# Patient Record
Sex: FEMALE | Race: WHITE
[De-identification: names, ages, dates, MRNs, and addresses within clinical notes are randomized per-mention and may not be internally consistent; named-entity substitution may affect disease eponyms.]

---

## 2017-01-30 ENCOUNTER — RX RENEWAL (OUTPATIENT)
Age: 57
End: 2017-01-30

## 2017-02-21 ENCOUNTER — RX RENEWAL (OUTPATIENT)
Age: 57
End: 2017-02-21

## 2017-04-20 ENCOUNTER — RX RENEWAL (OUTPATIENT)
Age: 57
End: 2017-04-20

## 2017-04-24 ENCOUNTER — RX RENEWAL (OUTPATIENT)
Age: 57
End: 2017-04-24

## 2017-05-16 ENCOUNTER — APPOINTMENT (OUTPATIENT)
Dept: PULMONOLOGY | Facility: CLINIC | Age: 57
End: 2017-05-16

## 2017-05-16 VITALS
SYSTOLIC BLOOD PRESSURE: 116 MMHG | BODY MASS INDEX: 27.44 KG/M2 | HEART RATE: 71 BPM | DIASTOLIC BLOOD PRESSURE: 70 MMHG | WEIGHT: 150 LBS | OXYGEN SATURATION: 99 %

## 2017-05-16 VITALS — RESPIRATION RATE: 16 BRPM

## 2018-05-18 ENCOUNTER — RX RENEWAL (OUTPATIENT)
Age: 58
End: 2018-05-18

## 2018-05-22 ENCOUNTER — RX RENEWAL (OUTPATIENT)
Age: 58
End: 2018-05-22

## 2018-06-19 ENCOUNTER — APPOINTMENT (OUTPATIENT)
Dept: PULMONOLOGY | Facility: CLINIC | Age: 58
End: 2018-06-19
Payer: COMMERCIAL

## 2018-06-19 VITALS — HEART RATE: 76 BPM | SYSTOLIC BLOOD PRESSURE: 108 MMHG | DIASTOLIC BLOOD PRESSURE: 60 MMHG | OXYGEN SATURATION: 98 %

## 2018-06-19 VITALS — WEIGHT: 154 LBS | BODY MASS INDEX: 28.17 KG/M2

## 2018-06-19 VITALS — RESPIRATION RATE: 16 BRPM

## 2018-06-19 PROCEDURE — 99214 OFFICE O/P EST MOD 30 MIN: CPT | Mod: 25

## 2018-06-19 PROCEDURE — 94010 BREATHING CAPACITY TEST: CPT

## 2018-07-25 ENCOUNTER — RX RENEWAL (OUTPATIENT)
Age: 58
End: 2018-07-25

## 2018-09-17 ENCOUNTER — RX RENEWAL (OUTPATIENT)
Age: 58
End: 2018-09-17

## 2018-11-19 ENCOUNTER — CHART COPY (OUTPATIENT)
Age: 58
End: 2018-11-19

## 2019-02-11 ENCOUNTER — RX RENEWAL (OUTPATIENT)
Age: 59
End: 2019-02-11

## 2019-06-03 ENCOUNTER — RX RENEWAL (OUTPATIENT)
Age: 59
End: 2019-06-03

## 2019-07-08 ENCOUNTER — RX RENEWAL (OUTPATIENT)
Age: 59
End: 2019-07-08

## 2019-08-07 ENCOUNTER — RX RENEWAL (OUTPATIENT)
Age: 59
End: 2019-08-07

## 2019-10-02 ENCOUNTER — MEDICATION RENEWAL (OUTPATIENT)
Age: 59
End: 2019-10-02

## 2019-10-02 ENCOUNTER — OTHER (OUTPATIENT)
Age: 59
End: 2019-10-02

## 2020-01-29 ENCOUNTER — RX RENEWAL (OUTPATIENT)
Age: 60
End: 2020-01-29

## 2020-01-30 ENCOUNTER — LABORATORY RESULT (OUTPATIENT)
Age: 60
End: 2020-01-30

## 2020-01-30 ENCOUNTER — APPOINTMENT (OUTPATIENT)
Dept: PULMONOLOGY | Facility: CLINIC | Age: 60
End: 2020-01-30
Payer: COMMERCIAL

## 2020-01-30 VITALS
HEART RATE: 71 BPM | WEIGHT: 150 LBS | BODY MASS INDEX: 27.6 KG/M2 | SYSTOLIC BLOOD PRESSURE: 120 MMHG | OXYGEN SATURATION: 98 % | DIASTOLIC BLOOD PRESSURE: 66 MMHG | HEIGHT: 62 IN

## 2020-01-30 VITALS — RESPIRATION RATE: 16 BRPM

## 2020-01-30 LAB
BASOPHILS # BLD AUTO: 0.08 K/UL
BASOPHILS NFR BLD AUTO: 1 %
EOSINOPHIL # BLD AUTO: 0.2 K/UL
EOSINOPHIL NFR BLD AUTO: 2.6 %
HCT VFR BLD CALC: 44.9 %
HGB BLD-MCNC: 14.2 G/DL
IMM GRANULOCYTES NFR BLD AUTO: 0.3 %
LYMPHOCYTES # BLD AUTO: 1.79 K/UL
LYMPHOCYTES NFR BLD AUTO: 23.4 %
MAN DIFF?: NORMAL
MCHC RBC-ENTMCNC: 29.3 PG
MCHC RBC-ENTMCNC: 31.6 GM/DL
MCV RBC AUTO: 92.6 FL
MONOCYTES # BLD AUTO: 0.57 K/UL
MONOCYTES NFR BLD AUTO: 7.5 %
NEUTROPHILS # BLD AUTO: 4.98 K/UL
NEUTROPHILS NFR BLD AUTO: 65.2 %
PLATELET # BLD AUTO: 160 K/UL
RBC # BLD: 4.85 M/UL
RBC # FLD: 11.9 %
WBC # FLD AUTO: 7.64 K/UL

## 2020-01-30 PROCEDURE — 94010 BREATHING CAPACITY TEST: CPT

## 2020-01-30 PROCEDURE — 99214 OFFICE O/P EST MOD 30 MIN: CPT | Mod: 25

## 2020-01-30 NOTE — REVIEW OF SYSTEMS
[Fatigue] : fatigue [As Noted in HPI] : as noted in HPI [Back Pain] : ~T back pain [Snoring] : snoring [Negative] : Endocrine [de-identified] : history of skin cancer

## 2020-01-30 NOTE — ASSESSMENT
[FreeTextEntry1] : Patient's asthma does seem to be out of control. She has frequent use of rescue inhaler in addition to high-dose Breo. I'm getting an asthma profile to look for allergies and the possible use of biological agents. A course of prednisone was given as well. Followup one month with spirometry.

## 2020-01-30 NOTE — HISTORY OF PRESENT ILLNESS
[TextBox_4] : Patient remains compliant with Breo but continues to report using Ventolin twice daily. She has not been able to cut it down. Recently she feels tighter than she has.

## 2020-01-30 NOTE — PHYSICAL EXAM
[Normal Appearance] : normal appearance [General Appearance - Well Developed] : well developed [No Deformities] : no deformities [Well Groomed] : well groomed [General Appearance - Well Nourished] : well nourished [Eyelids - No Xanthelasma] : the eyelids demonstrated no xanthelasmas [General Appearance - In No Acute Distress] : no acute distress [Normal Conjunctiva] : the conjunctiva exhibited no abnormalities [Neck Appearance] : the appearance of the neck was normal [Jugular Venous Distention Increased] : there was no jugular-venous distention [Neck Cervical Mass (___cm)] : no neck mass was observed [Thyroid Diffuse Enlargement] : the thyroid was not enlarged [Heart Rate And Rhythm] : heart rate and rhythm were normal [Thyroid Nodule] : there were no palpable thyroid nodules [Heart Sounds] : normal S1 and S2 [Murmurs] : no murmurs present [Auscultation Breath Sounds / Voice Sounds] : lungs were clear to auscultation bilaterally [Respiration, Rhythm And Depth] : normal respiratory rhythm and effort [Exaggerated Use Of Accessory Muscles For Inspiration] : no accessory muscle use [Abdomen Tenderness] : non-tender [Abdomen Soft] : soft [Abdomen Mass (___ Cm)] : no abdominal mass palpated [Gait - Sufficient For Exercise Testing] : the gait was sufficient for exercise testing [Abnormal Walk] : normal gait [Deep Tendon Reflexes (DTR)] : deep tendon reflexes were 2+ and symmetric [Sensation] : the sensory exam was normal to light touch and pinprick [No Focal Deficits] : no focal deficits [Affect] : the affect was normal [Impaired Insight] : insight and judgment were intact [Oriented To Time, Place, And Person] : oriented to person, place, and time [Normal Oral Mucosa] : normal oral mucosa [No Oral Cyanosis] : no oral cyanosis [No Oral Pallor] : no oral pallor [Nail Clubbing] : no clubbing of the fingernails [Cyanosis, Localized] : no localized cyanosis [Petechial Hemorrhages (___cm)] : no petechial hemorrhages [Skin Color & Pigmentation] : normal skin color and pigmentation [] : no rash [Skin Turgor] : normal skin turgor [FreeTextEntry1] : mild bilateral calf tenderness no edema

## 2020-02-03 LAB
A ALTERNATA IGE QN: 1.68 KUA/L
A FUMIGATUS IGE QN: <0.1 KUA/L
C ALBICANS IGE QN: 0.13 KUA/L
C HERBARUM IGE QN: <0.1 KUA/L
CAT DANDER IGE QN: 4.3 KUA/L
COMMON RAGWEED IGE QN: 0.41 KUA/L
D FARINAE IGE QN: 0.41 KUA/L
D PTERONYSS IGE QN: 0.88 KUA/L
DEPRECATED A ALTERNATA IGE RAST QL: 2
DEPRECATED A FUMIGATUS IGE RAST QL: 0
DEPRECATED C ALBICANS IGE RAST QL: NORMAL
DEPRECATED C HERBARUM IGE RAST QL: 0
DEPRECATED CAT DANDER IGE RAST QL: 3
DEPRECATED COMMON RAGWEED IGE RAST QL: 1
DEPRECATED D FARINAE IGE RAST QL: 1
DEPRECATED D PTERONYSS IGE RAST QL: 2
DEPRECATED DOG DANDER IGE RAST QL: 3
DEPRECATED M RACEMOSUS IGE RAST QL: 0
DEPRECATED ROACH IGE RAST QL: 0
DEPRECATED TIMOTHY IGE RAST QL: 0
DEPRECATED WHITE OAK IGE RAST QL: 0
DOG DANDER IGE QN: 15.2 KUA/L
M RACEMOSUS IGE QN: <0.1 KUA/L
ROACH IGE QN: <0.1 KUA/L
TIMOTHY IGE QN: <0.1 KUA/L
WHITE OAK IGE QN: <0.1 KUA/L

## 2020-03-06 ENCOUNTER — APPOINTMENT (OUTPATIENT)
Dept: PULMONOLOGY | Facility: CLINIC | Age: 60
End: 2020-03-06
Payer: COMMERCIAL

## 2020-03-06 VITALS
HEIGHT: 62 IN | WEIGHT: 152 LBS | DIASTOLIC BLOOD PRESSURE: 60 MMHG | HEART RATE: 77 BPM | OXYGEN SATURATION: 98 % | BODY MASS INDEX: 27.97 KG/M2 | SYSTOLIC BLOOD PRESSURE: 120 MMHG

## 2020-03-06 VITALS — RESPIRATION RATE: 16 BRPM

## 2020-03-06 DIAGNOSIS — J45.909 UNSPECIFIED ASTHMA, UNCOMPLICATED: ICD-10-CM

## 2020-03-06 PROCEDURE — 99214 OFFICE O/P EST MOD 30 MIN: CPT | Mod: 25

## 2020-03-06 PROCEDURE — 94010 BREATHING CAPACITY TEST: CPT

## 2020-03-06 RX ORDER — PREDNISONE 10 MG/1
10 TABLET ORAL
Qty: 30 | Refills: 1 | Status: DISCONTINUED | COMMUNITY
Start: 2020-01-30 | End: 2020-03-06

## 2020-03-06 NOTE — REVIEW OF SYSTEMS
[Fatigue] : fatigue [As Noted in HPI] : as noted in HPI [Back Pain] : ~T back pain [Snoring] : snoring [Negative] : Pulmonary Hypertension [de-identified] : history of skin cancer

## 2020-03-06 NOTE — HISTORY OF PRESENT ILLNESS
[TextBox_4] : Generally feeling well without any exacerbations since last visit. She's been using Singulair only occasionally.

## 2020-03-06 NOTE — ASSESSMENT
[FreeTextEntry1] : Patient has asthma with mildly elevated IgE level and multiple aeroallergens. Currently she's under control. I told her I would like her on Singulair daily in addition to her other medications. We will follow her in 4 months. If she has further exacerbations, we will have to consider Xolair injections.

## 2020-03-06 NOTE — PHYSICAL EXAM
[General Appearance - Well Developed] : well developed [Normal Appearance] : normal appearance [Well Groomed] : well groomed [General Appearance - Well Nourished] : well nourished [No Deformities] : no deformities [General Appearance - In No Acute Distress] : no acute distress [Normal Conjunctiva] : the conjunctiva exhibited no abnormalities [Eyelids - No Xanthelasma] : the eyelids demonstrated no xanthelasmas [Neck Appearance] : the appearance of the neck was normal [Neck Cervical Mass (___cm)] : no neck mass was observed [Jugular Venous Distention Increased] : there was no jugular-venous distention [Thyroid Diffuse Enlargement] : the thyroid was not enlarged [Thyroid Nodule] : there were no palpable thyroid nodules [Heart Rate And Rhythm] : heart rate and rhythm were normal [Heart Sounds] : normal S1 and S2 [Murmurs] : no murmurs present [Respiration, Rhythm And Depth] : normal respiratory rhythm and effort [Exaggerated Use Of Accessory Muscles For Inspiration] : no accessory muscle use [Auscultation Breath Sounds / Voice Sounds] : lungs were clear to auscultation bilaterally [Abdomen Soft] : soft [Abdomen Tenderness] : non-tender [Abdomen Mass (___ Cm)] : no abdominal mass palpated [Abnormal Walk] : normal gait [Gait - Sufficient For Exercise Testing] : the gait was sufficient for exercise testing [Deep Tendon Reflexes (DTR)] : deep tendon reflexes were 2+ and symmetric [Sensation] : the sensory exam was normal to light touch and pinprick [No Focal Deficits] : no focal deficits [Oriented To Time, Place, And Person] : oriented to person, place, and time [Impaired Insight] : insight and judgment were intact [Affect] : the affect was normal [Normal Oral Mucosa] : normal oral mucosa [No Oral Pallor] : no oral pallor [No Oral Cyanosis] : no oral cyanosis [Nail Clubbing] : no clubbing of the fingernails [Cyanosis, Localized] : no localized cyanosis [Petechial Hemorrhages (___cm)] : no petechial hemorrhages [FreeTextEntry1] : mild bilateral calf tenderness no edema [Skin Color & Pigmentation] : normal skin color and pigmentation [Skin Turgor] : normal skin turgor [] : no rash

## 2020-09-16 ENCOUNTER — APPOINTMENT (OUTPATIENT)
Dept: PULMONOLOGY | Facility: CLINIC | Age: 60
End: 2020-09-16
Payer: COMMERCIAL

## 2020-09-16 VITALS — SYSTOLIC BLOOD PRESSURE: 118 MMHG | HEART RATE: 62 BPM | DIASTOLIC BLOOD PRESSURE: 68 MMHG | OXYGEN SATURATION: 98 %

## 2020-09-16 VITALS — RESPIRATION RATE: 16 BRPM

## 2020-09-16 DIAGNOSIS — J45.41 MODERATE PERSISTENT ASTHMA WITH (ACUTE) EXACERBATION: ICD-10-CM

## 2020-09-16 PROCEDURE — 99214 OFFICE O/P EST MOD 30 MIN: CPT

## 2020-09-16 NOTE — ASSESSMENT
[FreeTextEntry1] : Asthma with allergic component under control. I impressed upon her the importance of using montelukast every day especially during allergy season. Medications renewed. Followup 6 months.

## 2020-09-16 NOTE — PHYSICAL EXAM
[Well Groomed] : well groomed [General Appearance - Well Developed] : well developed [Normal Appearance] : normal appearance [No Deformities] : no deformities [General Appearance - Well Nourished] : well nourished [Normal Conjunctiva] : the conjunctiva exhibited no abnormalities [General Appearance - In No Acute Distress] : no acute distress [Neck Appearance] : the appearance of the neck was normal [Eyelids - No Xanthelasma] : the eyelids demonstrated no xanthelasmas [Jugular Venous Distention Increased] : there was no jugular-venous distention [Thyroid Diffuse Enlargement] : the thyroid was not enlarged [Neck Cervical Mass (___cm)] : no neck mass was observed [Heart Rate And Rhythm] : heart rate and rhythm were normal [Thyroid Nodule] : there were no palpable thyroid nodules [Murmurs] : no murmurs present [Heart Sounds] : normal S1 and S2 [Respiration, Rhythm And Depth] : normal respiratory rhythm and effort [Exaggerated Use Of Accessory Muscles For Inspiration] : no accessory muscle use [Abdomen Soft] : soft [Auscultation Breath Sounds / Voice Sounds] : lungs were clear to auscultation bilaterally [Abdomen Tenderness] : non-tender [Abdomen Mass (___ Cm)] : no abdominal mass palpated [Abnormal Walk] : normal gait [Gait - Sufficient For Exercise Testing] : the gait was sufficient for exercise testing [Deep Tendon Reflexes (DTR)] : deep tendon reflexes were 2+ and symmetric [Sensation] : the sensory exam was normal to light touch and pinprick [Oriented To Time, Place, And Person] : oriented to person, place, and time [No Focal Deficits] : no focal deficits [Affect] : the affect was normal [Impaired Insight] : insight and judgment were intact [Normal Oral Mucosa] : normal oral mucosa [No Oral Pallor] : no oral pallor [No Oral Cyanosis] : no oral cyanosis [Nail Clubbing] : no clubbing of the fingernails [Petechial Hemorrhages (___cm)] : no petechial hemorrhages [Cyanosis, Localized] : no localized cyanosis [FreeTextEntry1] : mild bilateral calf tenderness no edema [Skin Color & Pigmentation] : normal skin color and pigmentation [] : no rash [Skin Turgor] : normal skin turgor

## 2020-09-16 NOTE — HISTORY OF PRESENT ILLNESS
[TextBox_4] : Patient is generally doing well on Breo. She's been intermittent on the montelukast. Denies any exacerbations in the last 6 months. Has been gardening and doing outdoor work without shortness of breath.

## 2020-09-16 NOTE — REVIEW OF SYSTEMS
[Fatigue] : fatigue [As Noted in HPI] : as noted in HPI [Snoring] : snoring [Back Pain] : ~T back pain [de-identified] : history of skin cancer [Negative] : Pulmonary Hypertension

## 2020-10-01 ENCOUNTER — RX RENEWAL (OUTPATIENT)
Age: 60
End: 2020-10-01

## 2021-08-04 ENCOUNTER — RX RENEWAL (OUTPATIENT)
Age: 61
End: 2021-08-04

## 2021-08-04 RX ORDER — ALBUTEROL SULFATE 90 UG/1
108 (90 BASE) INHALANT RESPIRATORY (INHALATION)
Qty: 3 | Refills: 3 | Status: ACTIVE | COMMUNITY
Start: 2021-08-04 | End: 1900-01-01

## 2024-03-08 ENCOUNTER — APPOINTMENT (OUTPATIENT)
Dept: OPHTHALMOLOGY | Facility: CLINIC | Age: 64
End: 2024-03-08
Payer: COMMERCIAL

## 2024-03-08 ENCOUNTER — NON-APPOINTMENT (OUTPATIENT)
Age: 64
End: 2024-03-08

## 2024-03-08 PROCEDURE — 92004 COMPRE OPH EXAM NEW PT 1/>: CPT

## 2024-03-08 PROCEDURE — 92133 CPTRZD OPH DX IMG PST SGM ON: CPT

## 2024-09-09 ENCOUNTER — APPOINTMENT (OUTPATIENT)
Dept: OPHTHALMOLOGY | Facility: CLINIC | Age: 64
End: 2024-09-09
Payer: MEDICARE

## 2024-09-09 ENCOUNTER — NON-APPOINTMENT (OUTPATIENT)
Age: 64
End: 2024-09-09

## 2024-09-09 ENCOUNTER — APPOINTMENT (OUTPATIENT)
Dept: OPHTHALMOLOGY | Facility: CLINIC | Age: 64
End: 2024-09-09

## 2024-09-09 PROCEDURE — 92002 INTRM OPH EXAM NEW PATIENT: CPT

## 2024-09-09 PROCEDURE — 92083 EXTENDED VISUAL FIELD XM: CPT
